# Patient Record
Sex: FEMALE | Race: WHITE | NOT HISPANIC OR LATINO | Employment: FULL TIME | URBAN - METROPOLITAN AREA
[De-identification: names, ages, dates, MRNs, and addresses within clinical notes are randomized per-mention and may not be internally consistent; named-entity substitution may affect disease eponyms.]

---

## 2023-04-20 ENCOUNTER — OFFICE VISIT (OUTPATIENT)
Dept: PEDIATRICS | Facility: CLINIC | Age: 18
End: 2023-04-20
Payer: COMMERCIAL

## 2023-04-20 VITALS
DIASTOLIC BLOOD PRESSURE: 62 MMHG | TEMPERATURE: 98.5 F | BODY MASS INDEX: 27.42 KG/M2 | WEIGHT: 136 LBS | HEIGHT: 59 IN | SYSTOLIC BLOOD PRESSURE: 104 MMHG

## 2023-04-20 DIAGNOSIS — R59.0 REACTIVE CERVICAL LYMPHADENOPATHY: Primary | ICD-10-CM

## 2023-04-20 DIAGNOSIS — Z13.9 SCREENING FOR CONDITION: ICD-10-CM

## 2023-04-20 LAB — POC RAPID STREP: NEGATIVE

## 2023-04-20 PROCEDURE — 87880 STREP A ASSAY W/OPTIC: CPT | Performed by: PEDIATRICS

## 2023-04-20 PROCEDURE — 87081 CULTURE SCREEN ONLY: CPT

## 2023-04-20 PROCEDURE — 99213 OFFICE O/P EST LOW 20 MIN: CPT | Performed by: PEDIATRICS

## 2023-04-20 NOTE — PROGRESS NOTES
Here with self  Patient presents with swollen and painful lymph nodes on both side of her neck. This has been true for about 6 days there is no fever There is no runny nose nor cough There is no sore throat nor ear pain there is no vomit or diarrhea  Alert  Per  No nasal discharge  Pharynx  no redness no exudate, membranes moist  TM clear  Has cervical lymphadenopathy- largest is on left and less than 1 cm. Right lymph node is smaller- mildly tender and moveable  RRR  CTA  No rash   1. Screening for condition  The rapid strep was negative A back up test will be performed Our office will call with positive results You may use symptomatic treatment as needed return as needed    - POCT rapid strep A manually resulted  - Group A Streptococcus, Culture; Future  - Group A Streptococcus, Culture    2. Reactive cervical lymphadenopathy  We discussed reactive lymph nodes. No treatment is needed at this time. Please call if a node enlarges, the skin over the top of the area gets red, you develop a fever or other new symptoms  Return as needed

## 2023-04-23 LAB — GROUP A STREP SCREEN, CULTURE: NORMAL

## 2023-04-24 ENCOUNTER — APPOINTMENT (OUTPATIENT)
Dept: PEDIATRICS | Facility: CLINIC | Age: 18
End: 2023-04-24
Payer: COMMERCIAL

## 2023-08-17 LAB — HEPATITIS B VIRUS SURFACE AB (MIU/ML) IN SERUM: 31.2 MIU/ML

## 2023-08-18 LAB
NIL(NEG) CONTROL SPOT COUNT: NORMAL
PANEL A SPOT COUNT: 2
PANEL B SPOT COUNT: 1
POS CONTROL SPOT COUNT: NORMAL
T-SPOT. TB INTERPRETATION: NEGATIVE

## 2024-10-02 ENCOUNTER — OFFICE VISIT (OUTPATIENT)
Dept: PEDIATRICS | Facility: CLINIC | Age: 19
End: 2024-10-02
Payer: COMMERCIAL

## 2024-10-02 VITALS
SYSTOLIC BLOOD PRESSURE: 104 MMHG | DIASTOLIC BLOOD PRESSURE: 62 MMHG | BODY MASS INDEX: 26.41 KG/M2 | WEIGHT: 131 LBS | HEIGHT: 59 IN

## 2024-10-02 DIAGNOSIS — Z01.00 ENCOUNTER FOR VISION SCREENING: ICD-10-CM

## 2024-10-02 DIAGNOSIS — Z13.31 DEPRESSION SCREEN: ICD-10-CM

## 2024-10-02 DIAGNOSIS — Z00.129 ENCOUNTER FOR ROUTINE CHILD HEALTH EXAMINATION WITHOUT ABNORMAL FINDINGS: Primary | ICD-10-CM

## 2024-10-02 PROBLEM — L70.0 ACNE VULGARIS: Status: RESOLVED | Noted: 2024-10-02 | Resolved: 2024-10-02

## 2024-10-02 PROBLEM — N64.9 BREAST LESION: Status: RESOLVED | Noted: 2024-10-02 | Resolved: 2024-10-02

## 2024-10-02 PROCEDURE — 3008F BODY MASS INDEX DOCD: CPT | Performed by: PEDIATRICS

## 2024-10-02 PROCEDURE — 99395 PREV VISIT EST AGE 18-39: CPT | Performed by: PEDIATRICS

## 2024-10-02 PROCEDURE — 96127 BRIEF EMOTIONAL/BEHAV ASSMT: CPT | Performed by: PEDIATRICS

## 2024-10-02 PROCEDURE — 99173 VISUAL ACUITY SCREEN: CPT | Performed by: PEDIATRICS

## 2024-10-02 PROCEDURE — 1036F TOBACCO NON-USER: CPT | Performed by: PEDIATRICS

## 2024-10-02 SDOH — SOCIAL STABILITY: SOCIAL INSECURITY: RISK FACTORS RELATED TO RELATIONSHIPS: 0

## 2024-10-02 SDOH — SOCIAL STABILITY: SOCIAL INSECURITY: RISK FACTORS RELATED TO PERSONAL SAFETY: 0

## 2024-10-02 SDOH — ECONOMIC STABILITY: GENERAL: RISK FACTORS BASED ON SPECIAL CIRCUMSTANCES: 0

## 2024-10-02 SDOH — HEALTH STABILITY: MENTAL HEALTH: RISK FACTORS RELATED TO DRUGS: 0

## 2024-10-02 SDOH — SOCIAL STABILITY: SOCIAL INSECURITY: RISK FACTORS RELATED TO FRIENDS OR FAMILY: 0

## 2024-10-02 SDOH — SOCIAL STABILITY: SOCIAL INSECURITY: RISK FACTORS AT SCHOOL: 0

## 2024-10-02 SDOH — HEALTH STABILITY: MENTAL HEALTH: RISK FACTORS RELATED TO TOBACCO: 0

## 2024-10-02 SDOH — HEALTH STABILITY: PHYSICAL HEALTH: RISK FACTORS RELATED TO DIET: 0

## 2024-10-02 SDOH — HEALTH STABILITY: MENTAL HEALTH: SMOKING IN HOME: 1

## 2024-10-02 SDOH — HEALTH STABILITY: MENTAL HEALTH: RISK FACTORS RELATED TO EMOTIONS: 0

## 2024-10-02 ASSESSMENT — ENCOUNTER SYMPTOMS
STRIDOR: 0
RHINORRHEA: 0
SORE THROAT: 0
CHILLS: 0
FEVER: 0
SHORTNESS OF BREATH: 0
CONSTIPATION: 0
HEADACHES: 0
AVERAGE SLEEP DURATION (HRS): 8
WHEEZING: 0
NAUSEA: 0
ACTIVITY CHANGE: 0
VOMITING: 0
FATIGUE: 0
ABDOMINAL PAIN: 0
DIARRHEA: 0
SNORING: 0
SLEEP DISTURBANCE: 0
COUGH: 0
APPETITE CHANGE: 0

## 2024-10-02 NOTE — PROGRESS NOTES
Subjective   HPI       Well Child     Additional comments: Here by self  VIS given for flu declines  Vision:done today  Insurance:mm  Forms:no  Depression form given   Smoke/vape: prior vape use  Completed by Vida Martinez RN           Last edited by Vida Martinez RN on 10/2/2024 12:15 PM.         History was provided by the  alone .  Maricruz Zambrano is a 18 y.o. female who is here for this well child visit.  Immunization History   Administered Date(s) Administered    BCG 2003    DTaP vaccine, pediatric  (INFANRIX) 01/03/2004, 11/10/2004, 01/12/2005, 01/08/2009    DTaP, Unspecified 11/02/2005    HPV 9-valent vaccine (GARDASIL 9) 12/30/2015, 02/10/2016, 07/06/2016    Hepatitis A vaccine, pediatric/adolescent (HAVRIX, VAQTA) 07/18/2017, 09/18/2018    Hepatitis B vaccine, 19 yrs and under (RECOMBIVAX, ENGERIX) 11/10/2004, 01/10/2005, 05/10/2005    HiB PRP-T conjugate vaccine (HIBERIX, ACTHIB) 08/23/2006    MMR vaccine, subcutaneous (MMR II) 11/10/2004, 01/08/2009    Meningococcal ACWY vaccine (MENVEO) 09/10/2021    Meningococcal ACWY-D (Menactra) 4-valent conjugate vaccine 01/12/2004, 01/12/2005, 12/30/2015    Pneumococcal Conjugate PCV 7 08/23/2006    Poliovirus vaccine, subcutaneous (IPOL) 01/03/2004, 11/10/2004, 01/12/2005, 01/08/2009    Tdap vaccine, age 7 year and older (BOOSTRIX, ADACEL) 12/30/2015    Varicella vaccine, subcutaneous (VARIVAX) 01/10/2008, 01/08/2009     History of previous adverse reactions to immunizations? no  The following portions of the patient's history were reviewed by a provider in this encounter and updated as appropriate:       No concerns today. No ED and no hospitalizations since last well child check.     Well Child Assessment:  History provided by: alone. Maricruz lives with her mother, father and sister.   Nutrition  Types of intake include cereals, cow's milk, eggs, fruits, vegetables and meats (limits juice and junk food intake.).   Dental  The patient has a dental home. The  patient brushes teeth regularly. Last dental exam was more than a year ago.   Elimination  Elimination problems do not include constipation, diarrhea or urinary symptoms.   Sleep  Average sleep duration is 8 hours. The patient does not snore. There are no sleep problems.   Safety  There is smoking in the home (sister vapes at home.). Home has working smoke alarms? yes. Home has working carbon monoxide alarms? yes.   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 Child is doing well in school.   Screening  There are no risk factors for dyslipidemia. There are no risk factors for vision problems. There are no risk factors related to diet. There are no risk factors at school. There are risk factors for sexually transmitted infections (patient has been sexually active in the past not currently uses condoms every time, declines std screening.). There are risk factors related to alcohol (occassionally). There are no risk factors related to relationships. There are no risk factors related to friends or family. There are no risk factors related to emotions. There are no risk factors related to drugs. There are no risk factors related to personal safety. There are no risk factors related to tobacco. There are no risk factors related to special circumstances.   Social  Sibling interactions are good. The child spends 4 hours in front of a screen (tv or computer) per day.     Positive seatbelt use. Patient dose not ride a bike anymore. Positive routine exercise.     LMP: 9/5/2024. Regular, not too heavy and not too crampy.     Review of Systems   Constitutional:  Negative for activity change, appetite change, chills, fatigue and fever.   HENT:  Negative for congestion, rhinorrhea and sore throat.    Respiratory:  Negative for snoring, cough, shortness of breath, wheezing and stridor.    Gastrointestinal:  Negative for abdominal pain, constipation, diarrhea, nausea and vomiting.    Genitourinary:  Negative for decreased urine volume and menstrual problem.   Skin:  Negative for rash.   Neurological:  Negative for headaches.   Psychiatric/Behavioral:  Negative for sleep disturbance.        Objective   Vitals:    10/02/24 1216   BP: 104/62      Vision Screening    Right eye Left eye Both eyes   Without correction 20/20 20/20    With correction          Growth parameters are noted and are appropriate for age.  Physical Exam  Constitutional:       Appearance: Normal appearance.   HENT:      Head: Normocephalic and atraumatic.      Right Ear: Tympanic membrane, ear canal and external ear normal. There is no impacted cerumen.      Left Ear: Tympanic membrane, ear canal and external ear normal. There is no impacted cerumen.      Nose: No congestion or rhinorrhea.      Mouth/Throat:      Mouth: Mucous membranes are moist.      Pharynx: Oropharynx is clear. No oropharyngeal exudate or posterior oropharyngeal erythema.   Eyes:      Extraocular Movements: Extraocular movements intact.      Conjunctiva/sclera: Conjunctivae normal.      Pupils: Pupils are equal, round, and reactive to light.   Cardiovascular:      Rate and Rhythm: Normal rate and regular rhythm.      Heart sounds: Normal heart sounds. No murmur heard.     No friction rub. No gallop.   Pulmonary:      Effort: Pulmonary effort is normal. No respiratory distress.      Breath sounds: Normal breath sounds. No stridor. No wheezing, rhonchi or rales.   Abdominal:      General: Abdomen is flat. Bowel sounds are normal. There is no distension.      Palpations: Abdomen is soft.      Tenderness: There is no abdominal tenderness. There is no guarding.   Genitourinary:     Comments: Deferred, patient to schedule gyn appointment for routine gyn exams.   Musculoskeletal:         General: Normal range of motion.      Comments: Normal spine curvature    Lymphadenopathy:      Cervical: No cervical adenopathy.   Skin:     General: Skin is warm and dry.    Neurological:      General: No focal deficit present.      Mental Status: She is alert.   Psychiatric:         Mood and Affect: Mood normal.         Assessment/Plan   18 year old female here for routine well child check. Normal growth and development. Vision screen passed today. Negative depression screen. She is overall well appearing and clinically stable.     1. Anticipatory guidance discussed.  Specific topics reviewed: bicycle helmets, breast self-exam, drugs, ETOH, and tobacco, importance of regular dental care, importance of regular exercise, importance of varied diet, limit TV, media violence, minimize junk food, seat belts, and sex; STD and pregnancy prevention. Recommend a more thorough vision exam with optometry once a year or every other year at your convenience.   2.  Weight management:  The patient was counseled regarding nutrition and physical activity.  3. Development: appropriate for age  4. Recommend valentina B and flu vaccines today, side effects, risk/benefits discussed VIS given. Given patient's age unable to do in the office today but she can go to the [facility] for these vaccines today.   5. Recommend scheduling your first gyn appointment once 19 years old for routine pap smears and gyn care.     6. Follow-up visit in 1 year for next well child visit, or sooner as needed. Recommend transitioning to adult medicine for next wellness visit for routine wellness care.     Feel free to contact our office if any new questions or concerns arise.

## 2024-10-09 ENCOUNTER — TELEPHONE (OUTPATIENT)
Dept: PEDIATRICS | Facility: CLINIC | Age: 19
End: 2024-10-09
Payer: COMMERCIAL

## 2024-10-09 DIAGNOSIS — Z23 ENCOUNTER FOR IMMUNIZATION: Primary | ICD-10-CM

## 2024-10-09 NOTE — TELEPHONE ENCOUNTER
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 Will order lab work and have triage nurse contact patient to have her bring any additional paperwork to the office to be filled out for school.

## 2024-10-09 NOTE — TELEPHONE ENCOUNTER
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  Maricruz will go to a  lab and she said she'll check her program's requirements and if she has a form that she needs she'll email to [facility] address to 's attention.  TERRI and can send back to me.

## 2024-10-10 ENCOUNTER — LAB (OUTPATIENT)
Dept: LAB | Facility: LAB | Age: 19
End: 2024-10-10
Payer: COMMERCIAL

## 2024-10-10 DIAGNOSIS — Z23 ENCOUNTER FOR IMMUNIZATION: ICD-10-CM

## 2024-10-10 LAB
HBV SURFACE AB SER-ACNC: 26.9 MIU/ML
MEV IGG SER QL IA: POSITIVE
MUMPS IGG ANTIBODY INDEX: 1.8 IA
MUV IGG SER IA-ACNC: POSITIVE
RUBEOLA IGG ANTIBODY INDEX: 2.9 IA
RUBV IGG SERPL IA-ACNC: 1 IA
RUBV IGG SERPL QL IA: POSITIVE
VARICELLA ZOSTER IGG INDEX: 0.2 IA
VZV IGG SER QL IA: NEGATIVE

## 2024-10-10 PROCEDURE — 86481 TB AG RESPONSE T-CELL SUSP: CPT

## 2024-10-10 PROCEDURE — 86706 HEP B SURFACE ANTIBODY: CPT

## 2024-10-10 PROCEDURE — 86765 RUBEOLA ANTIBODY: CPT

## 2024-10-10 PROCEDURE — 86735 MUMPS ANTIBODY: CPT

## 2024-10-10 PROCEDURE — 86317 IMMUNOASSAY INFECTIOUS AGENT: CPT

## 2024-10-10 PROCEDURE — 36415 COLL VENOUS BLD VENIPUNCTURE: CPT

## 2024-10-10 PROCEDURE — 86787 VARICELLA-ZOSTER ANTIBODY: CPT

## 2024-10-11 NOTE — TELEPHONE ENCOUNTER
Results of hep B, varicella and mmr titers on chart.   She has immunity to hep B and mmr, but not to varicella.  TB spot is still pending.  According to her schools paperwork, since no immunity to varicella she has to have 2 doses of varicella vaccine.  Send back to me so I can watch for tb result.

## 2024-10-12 LAB
NIL(NEG) CONTROL SPOT COUNT: NORMAL
PANEL A SPOT COUNT: 0
PANEL B SPOT COUNT: 1
POS CONTROL SPOT COUNT: NORMAL
T-SPOT. TB INTERPRETATION: NEGATIVE

## 2024-10-14 NOTE — TELEPHONE ENCOUNTER
Results of TB spot on chart and is negative.   See below for results of mmr, hep b and varicella.   Printed all lab results.  Left msg for Maricruz fuller.

## 2024-10-15 NOTE — TELEPHONE ENCOUNTER
Discussed lab results with Maricruz and that she'll need to repeat the varicella series of vaccines based on her school's requirements.  Maricruz understands plan.  She emailed her program form for LINA to sign and we discussed that LINA will complete the form and that  will call her when it's ready.  Confirmed her insurance and confirmed w/literature from vaccine that she can come here for vaccine.  She scheduled [facility] apt[facility] for varicella vaccines.  She can  an updated vaccine record once she's done with the series.   TERRI MILLS.

## 2024-10-22 ENCOUNTER — APPOINTMENT (OUTPATIENT)
Dept: PEDIATRICS | Facility: CLINIC | Age: 19
End: 2024-10-22
Payer: COMMERCIAL

## 2024-10-22 VITALS — TEMPERATURE: 98.8 F

## 2024-10-22 DIAGNOSIS — Z23 ENCOUNTER FOR IMMUNIZATION: ICD-10-CM

## 2024-10-22 PROCEDURE — 99211 OFF/OP EST MAY X REQ PHY/QHP: CPT | Performed by: PEDIATRICS

## 2024-10-22 PROCEDURE — 90716 VAR VACCINE LIVE SUBQ: CPT | Performed by: PEDIATRICS

## 2024-10-22 PROCEDURE — 90471 IMMUNIZATION ADMIN: CPT | Performed by: PEDIATRICS

## 2024-11-13 ENCOUNTER — APPOINTMENT (OUTPATIENT)
Dept: PEDIATRICS | Facility: CLINIC | Age: 19
End: 2024-11-13
Payer: COMMERCIAL

## 2024-11-26 ENCOUNTER — APPOINTMENT (OUTPATIENT)
Dept: PEDIATRICS | Facility: CLINIC | Age: 19
End: 2024-11-26
Payer: COMMERCIAL

## 2024-11-26 DIAGNOSIS — Z23 ENCOUNTER FOR IMMUNIZATION: ICD-10-CM

## 2024-11-26 PROCEDURE — 99211 OFF/OP EST MAY X REQ PHY/QHP: CPT | Performed by: PEDIATRICS

## 2024-11-26 PROCEDURE — 90471 IMMUNIZATION ADMIN: CPT | Performed by: PEDIATRICS

## 2024-11-26 PROCEDURE — 90716 VAR VACCINE LIVE SUBQ: CPT | Performed by: PEDIATRICS

## 2024-11-26 NOTE — PROGRESS NOTES
Here by self for varicella booster. Allergies and insurance verified. Verified that Maricruz's LMP was last week

## 2025-05-12 PROCEDURE — 99284 EMERGENCY DEPT VISIT MOD MDM: CPT | Performed by: STUDENT IN AN ORGANIZED HEALTH CARE EDUCATION/TRAINING PROGRAM

## 2025-05-12 ASSESSMENT — COLUMBIA-SUICIDE SEVERITY RATING SCALE - C-SSRS
2. HAVE YOU ACTUALLY HAD ANY THOUGHTS OF KILLING YOURSELF?: NO
6. HAVE YOU EVER DONE ANYTHING, STARTED TO DO ANYTHING, OR PREPARED TO DO ANYTHING TO END YOUR LIFE?: NO
1. IN THE PAST MONTH, HAVE YOU WISHED YOU WERE DEAD OR WISHED YOU COULD GO TO SLEEP AND NOT WAKE UP?: NO

## 2025-05-13 ENCOUNTER — APPOINTMENT (OUTPATIENT)
Dept: RADIOLOGY | Facility: HOSPITAL | Age: 20
End: 2025-05-13
Payer: MEDICARE

## 2025-05-13 ENCOUNTER — HOSPITAL ENCOUNTER (EMERGENCY)
Facility: HOSPITAL | Age: 20
Discharge: HOME | End: 2025-05-13
Attending: STUDENT IN AN ORGANIZED HEALTH CARE EDUCATION/TRAINING PROGRAM
Payer: MEDICARE

## 2025-05-13 VITALS
RESPIRATION RATE: 18 BRPM | OXYGEN SATURATION: 100 % | BODY MASS INDEX: 25.33 KG/M2 | WEIGHT: 125.66 LBS | HEART RATE: 60 BPM | DIASTOLIC BLOOD PRESSURE: 74 MMHG | SYSTOLIC BLOOD PRESSURE: 110 MMHG | TEMPERATURE: 98.2 F | HEIGHT: 59 IN

## 2025-05-13 DIAGNOSIS — S06.0X0A CONCUSSION WITHOUT LOSS OF CONSCIOUSNESS, INITIAL ENCOUNTER: ICD-10-CM

## 2025-05-13 DIAGNOSIS — S16.1XXA CERVICAL STRAIN, ACUTE, INITIAL ENCOUNTER: ICD-10-CM
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
## 2025-05-13 LAB — HCG UR QL IA.RAPID: NEGATIVE

## 2025-05-13 PROCEDURE — 70450 CT HEAD/BRAIN W/O DYE: CPT

## 2025-05-13 PROCEDURE — 72125 CT NECK SPINE W/O DYE: CPT

## 2025-05-13 PROCEDURE — 72125 CT NECK SPINE W/O DYE: CPT | Performed by: STUDENT IN AN ORGANIZED HEALTH CARE EDUCATION/TRAINING PROGRAM

## 2025-05-13 PROCEDURE — 2500000004 HC RX 250 GENERAL PHARMACY W/ HCPCS (ALT 636 FOR OP/ED): Performed by: STUDENT IN AN ORGANIZED HEALTH CARE EDUCATION/TRAINING PROGRAM

## 2025-05-13 PROCEDURE — 81025 URINE PREGNANCY TEST: CPT | Performed by: STUDENT IN AN ORGANIZED HEALTH CARE EDUCATION/TRAINING PROGRAM

## 2025-05-13 PROCEDURE — 2500000005 HC RX 250 GENERAL PHARMACY W/O HCPCS: Performed by: STUDENT IN AN ORGANIZED HEALTH CARE EDUCATION/TRAINING PROGRAM

## 2025-05-13 PROCEDURE — 70450 CT HEAD/BRAIN W/O DYE: CPT | Performed by: STUDENT IN AN ORGANIZED HEALTH CARE EDUCATION/TRAINING PROGRAM

## 2025-05-13 PROCEDURE — 2500000001 HC RX 250 WO HCPCS SELF ADMINISTERED DRUGS (ALT 637 FOR MEDICARE OP): Performed by: STUDENT IN AN ORGANIZED HEALTH CARE EDUCATION/TRAINING PROGRAM

## 2025-05-13 RX ORDER — ONDANSETRON 4 MG/1
4 TABLET, ORALLY DISINTEGRATING ORAL ONCE
Status: COMPLETED | OUTPATIENT
Start: 2025-05-13 | End: 2025-05-13

## 2025-05-13 RX ORDER — IBUPROFEN 600 MG/1
600 TABLET ORAL ONCE
Status: COMPLETED | OUTPATIENT
Start: 2025-05-13 | End: 2025-05-13

## 2025-05-13 RX ORDER — CYCLOBENZAPRINE HCL 10 MG
5 TABLET ORAL ONCE
Status: COMPLETED | OUTPATIENT
Start: 2025-05-13 | End: 2025-05-13

## 2025-05-13 RX ORDER — IBUPROFEN 600 MG/1
600 TABLET ORAL EVERY 6 HOURS PRN
Qty: 28 TABLET | Refills: 0 | Status: SHIPPED | OUTPATIENT
Start: 2025-05-13 | End: 2025-05-20

## 2025-05-13 RX ORDER — LIDOCAINE 560 MG/1
1 PATCH PERCUTANEOUS; TOPICAL; TRANSDERMAL ONCE
Status: DISCONTINUED | OUTPATIENT
Start: 2025-05-13 | End: 2025-05-13 | Stop reason: HOSPADM

## 2025-05-13 RX ORDER — CYCLOBENZAPRINE HCL 5 MG
5 TABLET ORAL 2 TIMES DAILY PRN
Qty: 4 TABLET | Refills: 0 | Status: SHIPPED | OUTPATIENT
Start: 2025-05-13 | End: 2025-05-15

## 2025-05-13 RX ADMIN — CYCLOBENZAPRINE 5 MG: 10 TABLET, FILM COATED ORAL at 01:15

## 2025-05-13 RX ADMIN — ONDANSETRON 4 MG: 4 TABLET, ORALLY DISINTEGRATING ORAL at 00:25

## 2025-05-13 RX ADMIN — LIDOCAINE 4% 1 PATCH: 40 PATCH TOPICAL at 00:25

## 2025-05-13 RX ADMIN — IBUPROFEN 600 MG: 600 TABLET ORAL at 01:15

## 2025-05-13 ASSESSMENT — LIFESTYLE VARIABLES
EVER HAD A DRINK FIRST THING IN THE MORNING TO STEADY YOUR NERVES TO GET RID OF A HANGOVER: NO
HAVE PEOPLE ANNOYED YOU BY CRITICIZING YOUR DRINKING: NO
EVER FELT BAD OR GUILTY ABOUT YOUR DRINKING: NO
TOTAL SCORE: 0
HAVE YOU EVER FELT YOU SHOULD CUT DOWN ON YOUR DRINKING: NO

## 2025-05-13 ASSESSMENT — PAIN SCALES - GENERAL: PAINLEVEL_OUTOF10: 7

## 2025-05-13 ASSESSMENT — PAIN - FUNCTIONAL ASSESSMENT: PAIN_FUNCTIONAL_ASSESSMENT: 0-10
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
## 2025-05-13 NOTE — DISCHARGE INSTRUCTIONS
Do not drive or operate heavy machinery within 8 hours of taking a muscle relaxant.    Please call your primary care provider for follow up in the next few days.    Please follow the concussion protocol we discussed - limit screen time, take medication as prescribed, avoid excess activity, prolonged exertion or workouts, or any activities that are causing or worsening your symptoms.    Please return to the emergency department for any worsening symptoms or new injuries.